# Patient Record
Sex: FEMALE | Race: WHITE | NOT HISPANIC OR LATINO | Employment: FULL TIME | URBAN - METROPOLITAN AREA
[De-identification: names, ages, dates, MRNs, and addresses within clinical notes are randomized per-mention and may not be internally consistent; named-entity substitution may affect disease eponyms.]

---

## 2017-01-04 ENCOUNTER — OFFICE VISIT (OUTPATIENT)
Dept: OBSTETRICS AND GYNECOLOGY | Age: 27
End: 2017-01-04

## 2017-01-04 ENCOUNTER — PROCEDURE VISIT (OUTPATIENT)
Dept: OBSTETRICS AND GYNECOLOGY | Age: 27
End: 2017-01-04

## 2017-01-04 VITALS
SYSTOLIC BLOOD PRESSURE: 158 MMHG | HEIGHT: 69 IN | DIASTOLIC BLOOD PRESSURE: 108 MMHG | WEIGHT: 220 LBS | BODY MASS INDEX: 32.58 KG/M2

## 2017-01-04 DIAGNOSIS — R10.2 PELVIC PAIN IN FEMALE: Primary | ICD-10-CM

## 2017-01-04 LAB
BILIRUB BLD-MCNC: NEGATIVE MG/DL
CLARITY, POC: CLEAR
COLOR UR: YELLOW
GLUCOSE UR STRIP-MCNC: NEGATIVE MG/DL
KETONES UR QL: NEGATIVE
LEUKOCYTE EST, POC: NEGATIVE
NITRITE UR-MCNC: NEGATIVE MG/ML
PH UR: 6.5 [PH] (ref 5–8)
PROT UR STRIP-MCNC: NEGATIVE MG/DL
RBC # UR STRIP: ABNORMAL /UL
SP GR UR: 1.02 (ref 1–1.03)
UROBILINOGEN UR QL: NORMAL

## 2017-01-04 PROCEDURE — 76830 TRANSVAGINAL US NON-OB: CPT | Performed by: OBSTETRICS & GYNECOLOGY

## 2017-01-04 PROCEDURE — 99213 OFFICE O/P EST LOW 20 MIN: CPT | Performed by: PHYSICIAN ASSISTANT

## 2017-01-04 PROCEDURE — 81003 URINALYSIS AUTO W/O SCOPE: CPT | Performed by: PHYSICIAN ASSISTANT

## 2017-01-04 NOTE — PROGRESS NOTES
"Subjective     Chief Complaint   Patient presents with   • Abdominal Pain     Pain mostly on right side and has been going on times 2 months.       Katie Jung is a 26 y.o. No obstetric history on file. whose LMP is Patient's last menstrual period was 12/04/2016 (exact date). presents with intermittent RLQ pain.  Sitting worsens, walking improves sx's.  She has not had recent exposure to STD, no unusual d/c, has they gabriel and has liked it so far.  She has tried to check strings, but hasn't been able to feel them since the device was placed.  She denies n/v/fever.  She does have her appendix.  She has h/o back issues but doesn't usually radiate to the front      No Additional Complaints Reported    The following portions of the patient's history were reviewed and updated as appropriate:vital signs, allergies, current medications, past medical history, past social history, past surgical history and problem list      Review of Systems   Pertinent items are noted in HPI.     Objective        Visit Vitals   • BP (!) 158/108   • Ht 69\" (175.3 cm)   • Wt 220 lb (99.8 kg)   • LMP 12/04/2016 (Exact Date)   • Breastfeeding No   • BMI 32.49 kg/m2       Physical Exam    General:   alert, comfortable and no distress   Heart: Not performed today   Lungs: Not performed today.   Breast: Not performed today   Neck: na   Abdomen: {normal findings: symmetric, bowel sounds normal, slightly ttp in rlq,, neg mcburney's point   CVA: Not performed today   Pelvis: Vulva and vagina appear normal. Bimanual exam reveals normal uterus and adnexa.  Dark brown d/c noted, strings not visualized   Extremities: Not performed today   Neurologic: negative   Psychiatric: Normal affect, judgement, and mood       Lab Review   Labs: UA    Imaging   Ultrasound - Pelvic Vaginal    Assessment/Plan     ASSESSMENT  1. Pelvic pain in female        PLAN  1.   Orders Placed This Encounter   Procedures   • POC Urinalysis Dipstick, Multipro       2. Check U/S. "  If nl, plan a CBC and f/u with PCP to r/o appendicitis. Will also check a urine culture. I discussed reasons to go to ER, acutely worsened pain, fever or nausea.  Pt agreeable.     Plan annual with us in April          Follow up: 3 month(s)    JAYANT Campbell  1/4/2017

## 2017-01-04 NOTE — MR AVS SNAPSHOT
Katie Jung   1/4/2017 3:30 PM   Office Visit    Dept Phone:  [phone]   Encounter #:  [id number]    Provider:  JAYANT Ortiz   Department:  [facility]                Your Full Care Plan              Your Updated Medication List          This list is accurate as of: 1/4/17  4:16 PM.  Always use your most recent med list.                EPIPEN 2-CORONA 0.3 MG/0.3ML solution auto-injector injection   Generic drug:  EPINEPHrine       hydrochlorothiazide 25 MG tablet   Commonly known as:  HYDRODIURIL       BILLY 13.5 MG intrauterine device IUD   Generic drug:  Levonorgestrel               We Performed the Following     CBC & Differential     POC Urinalysis Dipstick, Multipro       You Were Diagnosed With        Codes Comments    Pelvic pain in female    -  Primary ICD-10-CM: R10.2  ICD-9-CM: 625.9       Instructions     None    Patient Instructions History      Upcoming Appointments     Visit Type Date Time Department    ULTRASOUND 1/4/2017  4:15 PM [facility]    GYN FOLLOW UP 1/4/2017  3:30 PM [facility]      MyChart Signup     Our records indicate that you have an active [facility] Classting account.    You can view your After Visit Summary by going to Beddit and logging in with your Classting username and password.  If you don't have a Classting username and password but a parent or guardian has access to your record, the parent or guardian should login with their own Classting username and password and access your record to view the After Visit Summary.    If you have questions, you can email [email or web address] or call [phone] to talk to our Classting staff.  Remember, Classting is NOT to be used for urgent needs.  For medical emergencies, dial 911.               Other Info from Your Visit           Allergies     Other  Anaphylaxis    Serum Sickness    Adhesive Tape      Contrast Dye      Eggs Or  "Egg-derived Products        Reason for Visit     Abdominal Pain Pain mostly on right side and has been going on times 2 months.      Vital Signs     Blood Pressure Height Weight Last Menstrual Period Breastfeeding? Body Mass Index    158/108 69\" (175.3 cm) 220 lb (99.8 kg) 12/04/2016 (Exact Date) No 32.49 kg/m2    Smoking Status                   Never Smoker           Problems and Diagnoses Noted     Pelvic pain in female    -  Primary      Results     POC Urinalysis Dipstick, Multipro      Component Value Standard Range & Units    Color Yellow Yellow, Straw, Dark Yellow, Chantale    Clarity, UA Clear Clear    Glucose, UA Negative Negative, 1000 mg/dL (3+) mg/dL    Bilirubin Negative Negative    Ketones, UA Negative Negative    Specific Gravity  1.025 1.005 - 1.030    Blood, UA Moderate Negative    pH, Urine 6.5 5.0 - 8.0    Protein, POC Negative Negative mg/dL    Urobilinogen, UA Normal Normal    Leukocytes Negative Negative    Nitrite, UA Negative Negative                    "

## 2017-01-04 NOTE — MR AVS SNAPSHOT
Katie Jung   1/4/2017 4:15 PM   Procedure visit    Dept Phone:  [phone]   Encounter #:  [id number]    Provider:  ULTRASOUND SANDRA QUACH   Department:  [facility]                Your Full Care Plan              Your Updated Medication List          This list is accurate as of: 1/4/17  4:17 PM.  Always use your most recent med list.                EPIPEN 2-CORONA 0.3 MG/0.3ML solution auto-injector injection   Generic drug:  EPINEPHrine       hydrochlorothiazide 25 MG tablet   Commonly known as:  HYDRODIURIL       BILLY 13.5 MG intrauterine device IUD   Generic drug:  Levonorgestrel               We Performed the Following     US Non-ob Transvaginal       You Were Diagnosed With        Codes Comments    Pelvic pain in female    -  Primary ICD-10-CM: R10.2  ICD-9-CM: 625.9       Instructions     None    Patient Instructions History      Upcoming Appointments     Visit Type Date Time Department    ULTRASOUND 1/4/2017  4:15 PM [facility]    GYN FOLLOW UP 1/4/2017  3:30 PM [facility]      [employer] Signup     Our records indicate that you have an active [facility] OrderMotion account.    You can view your After Visit Summary by going to OnKure and logging in with your OrderMotion username and password.  If you don't have a OrderMotion username and password but a parent or guardian has access to your record, the parent or guardian should login with their own OrderMotion username and password and access your record to view the After Visit Summary.    If you have questions, you can email [email or web address] or call [phone] to talk to our OrderMotion staff.  Remember, OrderMotion is NOT to be used for urgent needs.  For medical emergencies, dial 911.               Other Info from Your Visit           Allergies     Other  Anaphylaxis    Serum Sickness    Adhesive Tape      Contrast Dye      Eggs Or Egg-derived Products        Vital Signs      Last Menstrual Period Smoking Status                12/04/2016 (Exact Date) Never Smoker          Problems and Diagnoses Noted     Pelvic pain in female    -  Primary

## 2017-01-05 LAB
BASOPHILS # BLD AUTO: 0.02 10*3/MM3 (ref 0–0.2)
BASOPHILS NFR BLD AUTO: 0.2 % (ref 0–1.5)
EOSINOPHIL # BLD AUTO: 0.11 10*3/MM3 (ref 0–0.7)
EOSINOPHIL NFR BLD AUTO: 1 % (ref 0.3–6.2)
ERYTHROCYTE [DISTWIDTH] IN BLOOD BY AUTOMATED COUNT: 12.4 % (ref 11.7–13)
HCT VFR BLD AUTO: 46.2 % (ref 35.6–45.5)
HGB BLD-MCNC: 15.3 G/DL (ref 11.9–15.5)
IMM GRANULOCYTES # BLD: 0 10*3/MM3 (ref 0–0.03)
IMM GRANULOCYTES NFR BLD: 0 % (ref 0–0.5)
LYMPHOCYTES # BLD AUTO: 4.28 10*3/MM3 (ref 0.9–4.8)
LYMPHOCYTES NFR BLD AUTO: 38.6 % (ref 19.6–45.3)
MCH RBC QN AUTO: 31.5 PG (ref 26.9–32)
MCHC RBC AUTO-ENTMCNC: 33.1 G/DL (ref 32.4–36.3)
MCV RBC AUTO: 95.1 FL (ref 80.5–98.2)
MONOCYTES # BLD AUTO: 0.53 10*3/MM3 (ref 0.2–1.2)
MONOCYTES NFR BLD AUTO: 4.8 % (ref 5–12)
NEUTROPHILS # BLD AUTO: 6.16 10*3/MM3 (ref 1.9–8.1)
NEUTROPHILS NFR BLD AUTO: 55.4 % (ref 42.7–76)
PLATELET # BLD AUTO: 316 10*3/MM3 (ref 140–500)
RBC # BLD AUTO: 4.86 10*6/MM3 (ref 3.9–5.2)
WBC # BLD AUTO: 11.1 10*3/MM3 (ref 4.5–10.7)

## 2017-01-06 ENCOUNTER — TELEPHONE (OUTPATIENT)
Dept: OBSTETRICS AND GYNECOLOGY | Age: 27
End: 2017-01-06

## 2017-01-06 LAB
BACTERIA UR CULT: NORMAL
BACTERIA UR CULT: NORMAL

## 2017-01-06 NOTE — TELEPHONE ENCOUNTER
Pt notified     PRIYA farnsworth wanted me to let you know she had a ct scan yesterday to r/o appendicitis and she said it came back normal.

## 2017-01-06 NOTE — TELEPHONE ENCOUNTER
----- Message from JAYANT Ortiz sent at 1/6/2017  9:14 AM EST -----  Notify pt that her urine culture was negative, CBC wnl

## 2018-02-20 RX ORDER — NAPROXEN 375 MG/1
375 TABLET ORAL
COMMUNITY

## 2018-02-21 ENCOUNTER — OFFICE VISIT (OUTPATIENT)
Dept: NEUROSURGERY | Facility: CLINIC | Age: 28
End: 2018-02-21

## 2018-02-21 VITALS
BODY MASS INDEX: 37.56 KG/M2 | DIASTOLIC BLOOD PRESSURE: 76 MMHG | SYSTOLIC BLOOD PRESSURE: 132 MMHG | HEIGHT: 69 IN | HEART RATE: 76 BPM | WEIGHT: 253.6 LBS

## 2018-02-21 DIAGNOSIS — M54.16 LUMBAR RADICULOPATHY: Primary | ICD-10-CM

## 2018-02-21 PROBLEM — M54.12 CERVICAL RADICULITIS: Status: ACTIVE | Noted: 2018-02-21

## 2018-02-21 PROCEDURE — 99203 OFFICE O/P NEW LOW 30 MIN: CPT | Performed by: NEUROLOGICAL SURGERY

## 2018-02-21 NOTE — PROGRESS NOTES
Subjective   Patient ID: Katie Jung is a 27 y.o. female is here today as a self referral.  She complains of constant back pain and intermittent right leg pain. She has two SHANTANU with mild relief.  Mrs. Jung takes Tramadol 50 mg BID, Gabapentin 400 mg TID and Cyclobenzaprine 10 mg HS for pain.    Back Pain   This is a chronic problem. The current episode started more than 1 month ago. The problem occurs constantly. The problem has been waxing and waning since onset. The pain is present in the gluteal and sacro-iliac. The quality of the pain is described as aching, cramping and shooting. The pain radiates to the left foot, left knee and left thigh. The pain is at a severity of 7/10. The symptoms are aggravated by bending, position, sitting, standing, stress and twisting. Stiffness is present all day. Associated symptoms include leg pain, numbness, paresthesias, tingling and weakness. Pertinent negatives include no abdominal pain, bladder incontinence, bowel incontinence, chest pain, dysuria, headaches, paresis, pelvic pain, perianal numbness or weight loss.   Leg Pain    Associated symptoms include numbness and tingling.     This patient has been having fairly severe pain in her back and down her right leg for about 3 or 4 months.  The pain is located in the right side of lower back and radiates into the right buttock.  It goes down the posterior lateral thigh and posterior lateral calf but not very much into the foot.  The left side is okay.  She does have some occasional pain in the left side but nothing on a regular basis.  She was started on some Neurontin which has helped the pain in the leg but she still has a lot of weakness in her leg and pain in her back.  She has no difficulty with bowel or bladder control or other associated symptoms.  She has been treated with epidural blocks which did not help.  She has done some physical therapy which did not help either.  The pain is worse with activity and  nothing really makes it better.    The following portions of the patient's history were reviewed and updated as appropriate: allergies, current medications, past family history, past medical history, past social history, past surgical history and problem list.    Review of Systems   Constitutional: Negative for weight loss.   Respiratory: Negative for shortness of breath.    Cardiovascular: Negative for chest pain.   Gastrointestinal: Negative for abdominal pain and bowel incontinence.   Genitourinary: Negative for bladder incontinence, dysuria and pelvic pain.   Musculoskeletal: Positive for back pain.   Neurological: Positive for tingling, weakness, numbness and paresthesias. Negative for headaches.   All other systems reviewed and are negative.      Objective   Physical Exam   Constitutional: She is oriented to person, place, and time. She appears well-developed and well-nourished.   HENT:   Head: Normocephalic and atraumatic.   Eyes: Conjunctivae and EOM are normal. Pupils are equal, round, and reactive to light.   Fundoscopic exam:       The right eye shows no papilledema. The right eye shows venous pulsations.        The left eye shows no papilledema. The left eye shows venous pulsations.   Neck: Carotid bruit is not present.   Neurological: She is oriented to person, place, and time. She has a normal Finger-Nose-Finger Test and a normal Heel to Shin Test. Gait normal.   Reflex Scores:       Tricep reflexes are 2+ on the right side and 2+ on the left side.       Bicep reflexes are 2+ on the right side and 2+ on the left side.       Brachioradialis reflexes are 2+ on the right side and 2+ on the left side.       Patellar reflexes are 2+ on the right side and 2+ on the left side.       Achilles reflexes are 2+ on the right side and 2+ on the left side.  Psychiatric: Her speech is normal.     Neurologic Exam     Mental Status   Oriented to person, place, and time.   Registration of memory: Good recent and remote  memory.   Attention: normal. Concentration: normal.   Speech: speech is normal   Level of consciousness: alert  Knowledge: consistent with education.     Cranial Nerves     CN II   Visual fields full to confrontation.   Visual acuity: normal    CN III, IV, VI   Pupils are equal, round, and reactive to light.  Extraocular motions are normal.     CN V   Facial sensation intact.   Right corneal reflex: normal  Left corneal reflex: normal    CN VII   Facial expression full, symmetric.   Right facial weakness: none  Left facial weakness: none    CN VIII   Hearing: intact    CN IX, X   Palate: symmetric    CN XI   Right sternocleidomastoid strength: normal  Left sternocleidomastoid strength: normal    CN XII   Tongue: not atrophic  Tongue deviation: none    Motor Exam   Muscle bulk: normal  Right arm tone: normal  Left arm tone: normal  Right leg tone: normal  Left leg tone: normal    Strength   Strength 5/5 except as noted.     Sensory Exam   Light touch normal.     Gait, Coordination, and Reflexes     Gait  Gait: normal    Coordination   Finger to nose coordination: normal  Heel to shin coordination: normal    Reflexes   Right brachioradialis: 2+  Left brachioradialis: 2+  Right biceps: 2+  Left biceps: 2+  Right triceps: 2+  Left triceps: 2+  Right patellar: 2+  Left patellar: 2+  Right achilles: 2+  Left achilles: 2+  Right : 2+  Left : 2+      Assessment/Plan   Independent Review of Radiographic Studies:      I reviewed x-rays from [facility] which show no evidence of instability on flexion and extension.  I also reviewed an MRI of her lumbar spine done in November of last year.  This raises the question of a recurrent disc herniation on the right side at L5-S1.  The other levels look okay.  I am not sure I totally agree with the radiology interpretation on that.  L5-S1 level really looks mostly okay to me other than some granulation tissue.    Medical Decision Making:      I told the patient and her  dad about the MRI.  I told her that we could certainly proceed with a myelogram and potentially surgery if indicated but I really don't see any strong indications for surgery here.  Consequently I think that proceeding with the recommendation for a facet block and a possible radiofrequency ablation would be the best thing to do.  I discussed her situation with her pain management doctor and they will contact her today.    Katie was seen today for back pain and leg pain.    Diagnoses and all orders for this visit:    Lumbar radiculopathy      Return for Recheck and call after treatment or consultation.

## 2018-02-22 ENCOUNTER — TELEPHONE (OUTPATIENT)
Dept: NEUROSURGERY | Facility: CLINIC | Age: 28
End: 2018-02-22

## 2018-02-22 NOTE — TELEPHONE ENCOUNTER
Dr Varela is requesting a call from Dr Murray re his daughter seeing pain management, having a problem please call [phone]

## 2021-04-16 ENCOUNTER — BULK ORDERING (OUTPATIENT)
Dept: CASE MANAGEMENT | Facility: OTHER | Age: 31
End: 2021-04-16

## 2021-04-16 DIAGNOSIS — Z23 IMMUNIZATION DUE: ICD-10-CM
